# Patient Record
Sex: FEMALE | Race: WHITE | NOT HISPANIC OR LATINO | Employment: PART TIME | URBAN - METROPOLITAN AREA
[De-identification: names, ages, dates, MRNs, and addresses within clinical notes are randomized per-mention and may not be internally consistent; named-entity substitution may affect disease eponyms.]

---

## 2024-06-19 ENCOUNTER — OFFICE VISIT (OUTPATIENT)
Dept: URGENT CARE | Facility: CLINIC | Age: 19
End: 2024-06-19
Payer: COMMERCIAL

## 2024-06-19 VITALS
DIASTOLIC BLOOD PRESSURE: 86 MMHG | OXYGEN SATURATION: 98 % | SYSTOLIC BLOOD PRESSURE: 126 MMHG | WEIGHT: 175 LBS | HEIGHT: 66 IN | BODY MASS INDEX: 28.12 KG/M2 | RESPIRATION RATE: 18 BRPM | TEMPERATURE: 99 F | HEART RATE: 100 BPM

## 2024-06-19 DIAGNOSIS — J02.9 SORE THROAT: Primary | ICD-10-CM

## 2024-06-19 LAB
CTP QC/QA: YES
CTP QC/QA: YES
MOLECULAR STREP A: NEGATIVE
SARS-COV-2 RDRP RESP QL NAA+PROBE: NEGATIVE

## 2024-06-19 RX ORDER — LISDEXAMFETAMINE DIMESYLATE 50 MG/1
50 CAPSULE ORAL
COMMUNITY
Start: 2024-03-22

## 2024-06-19 RX ORDER — DEXAMETHASONE SODIUM PHOSPHATE 100 MG/10ML
10 INJECTION INTRAMUSCULAR; INTRAVENOUS
Status: COMPLETED | OUTPATIENT
Start: 2024-06-19 | End: 2024-06-19

## 2024-06-19 RX ORDER — DEXTROAMPHETAMINE SACCHARATE, AMPHETAMINE ASPARTATE MONOHYDRATE, DEXTROAMPHETAMINE SULFATE AND AMPHETAMINE SULFATE 5; 5; 5; 5 MG/1; MG/1; MG/1; MG/1
CAPSULE, EXTENDED RELEASE ORAL EVERY MORNING
COMMUNITY
Start: 2024-06-19

## 2024-06-19 RX ORDER — AZITHROMYCIN 500 MG/1
500 TABLET, FILM COATED ORAL DAILY
Qty: 5 TABLET | Refills: 0 | Status: SHIPPED | OUTPATIENT
Start: 2024-06-19 | End: 2024-06-24

## 2024-06-19 RX ORDER — PREDNISONE 10 MG/1
30 TABLET ORAL DAILY
Qty: 15 TABLET | Refills: 0 | Status: SHIPPED | OUTPATIENT
Start: 2024-06-19 | End: 2024-06-24

## 2024-06-19 RX ADMIN — DEXAMETHASONE SODIUM PHOSPHATE 10 MG: 100 INJECTION INTRAMUSCULAR; INTRAVENOUS at 06:06

## 2024-06-19 NOTE — PATIENT INSTRUCTIONS
Strep and covid test negative   If no improvement in 2-3 days begin antibiotic. Start taking an allergy pill daily such as claritin, zyrtec, allegrea or xyzal. Also start using a nasal steroid spray such as flonase or nasacort daily. If you are not being treated for high blood pressure, you can also take decongestant such as sudafed as needed. They can be purchased over the counter. If oral steroids were prescribed, start them tomorrow morning. Monitor for fever. Take tylenol/acetaminophen or ibuprofen as needed. Rest and hydrate. If symptoms persist or worsen, return to clinic or seek medical attention immediately.

## 2024-06-19 NOTE — PROGRESS NOTES
"Subjective:      Patient ID: Karen Carrillo is a 18 y.o. female.    Vitals:  height is 5' 6" (1.676 m) and weight is 79.4 kg (175 lb). Her temperature is 99 °F (37.2 °C). Her blood pressure is 126/86 and her pulse is 100. Her respiration is 18 and oxygen saturation is 98%.     Chief Complaint: Sore Throat    Patient is a 18 y.o. female who presents to urgent care with complaints of sore throat, body aches, headache x this morning, mild throat irritation last night. Alleviating factors include Advil, cough drops with mild amount of relief. Patient denies fever, cough, SOB, wheeze, hemoptysis, malaise, or CP or any other symptoms.     Sore Throat   Pertinent negatives include no congestion, coughing, ear discharge, ear pain, shortness of breath, stridor or trouble swallowing.     Constitution: Negative for chills, sweating, fatigue and fever.   HENT:  Positive for postnasal drip and sore throat. Negative for ear pain, ear discharge, congestion, sinus pain, sinus pressure, trouble swallowing and voice change.    Neck: neck negative.   Eyes: Negative.    Respiratory:  Negative for cough, sputum production, shortness of breath, stridor, wheezing and asthma.    Gastrointestinal: Negative.    Musculoskeletal: Negative.    Skin:  Negative for pale and rash.   Allergic/Immunologic: Negative for asthma.   Neurological: Negative.    Psychiatric/Behavioral: Negative.        Objective:     Physical Exam   Constitutional: She is oriented to person, place, and time. She appears well-developed. She is cooperative.  Non-toxic appearance. She does not appear ill. No distress.   HENT:   Head: Normocephalic and atraumatic.   Ears:   Right Ear: Hearing, tympanic membrane, external ear and ear canal normal.   Left Ear: Hearing, tympanic membrane, external ear and ear canal normal.   Nose: Nose normal. No mucosal edema, rhinorrhea or nasal deformity. No epistaxis. Right sinus exhibits no maxillary sinus tenderness and no frontal sinus " tenderness. Left sinus exhibits no maxillary sinus tenderness and no frontal sinus tenderness.   Mouth/Throat: Uvula is midline and mucous membranes are normal. Mucous membranes are moist. No trismus in the jaw. Normal dentition. No uvula swelling. Posterior oropharyngeal erythema present. No oropharyngeal exudate or posterior oropharyngeal edema.      Comments: Petechiae to soft palate.  Tonsils surgically absent  Eyes: Conjunctivae and lids are normal. No scleral icterus.   Neck: Trachea normal and phonation normal. Neck supple. No edema present. No erythema present. No neck rigidity present.   Cardiovascular: Normal rate, regular rhythm, normal heart sounds and normal pulses.   Pulmonary/Chest: Effort normal and breath sounds normal. No respiratory distress. She has no decreased breath sounds. She has no rhonchi.   Abdominal: Normal appearance.   Musculoskeletal: Normal range of motion.         General: No deformity. Normal range of motion.   Neurological: She is alert and oriented to person, place, and time. She exhibits normal muscle tone. Coordination normal.   Skin: Skin is warm, dry, intact, not diaphoretic and not pale.   Psychiatric: Her speech is normal and behavior is normal. Judgment and thought content normal.   Nursing note and vitals reviewed.         Previous History      Review of patient's allergies indicates:   Allergen Reactions    Amoxicillin        Past Medical History:   Diagnosis Date    Known health problems: none      Current Outpatient Medications   Medication Instructions    azithromycin (ZITHROMAX) 500 mg, Oral, Daily    dextroamphetamine-amphetamine (ADDERALL XR) 20 MG 24 hr capsule Every morning    lisdexamfetamine (VYVANSE) 50 mg    predniSONE (DELTASONE) 30 mg, Oral, Daily     Past Surgical History:   Procedure Laterality Date    NASAL SINUS SURGERY       Family History   Problem Relation Name Age of Onset    No Known Problems Mother      No Known Problems Father         Social  "History     Tobacco Use    Smoking status: Never    Smokeless tobacco: Never   Substance Use Topics    Alcohol use: Not Currently    Drug use: Never        Physical Exam      Vital Signs Reviewed   /86   Pulse 100   Temp 99 °F (37.2 °C)   Resp 18   Ht 5' 6" (1.676 m)   Wt 79.4 kg (175 lb)   LMP 06/19/2024 (Exact Date)   SpO2 98%   BMI 28.25 kg/m²        Procedures    Procedures     Labs     Results for orders placed or performed in visit on 06/19/24   POCT Strep A, Molecular   Result Value Ref Range    Molecular Strep A, POC Negative Negative     Acceptable Yes    POCT COVID-19 Rapid Screening   Result Value Ref Range    POC Rapid COVID Negative Negative     Acceptable Yes         Assessment:     1. Sore throat        Plan:   Strep and covid test negative   If no improvement in 2-3 days begin antibiotic. Start taking an allergy pill daily such as claritin, zyrtec, allegrea or xyzal. Also start using a nasal steroid spray such as flonase or nasacort daily. If you are not being treated for high blood pressure, you can also take decongestant such as sudafed as needed. They can be purchased over the counter. If oral steroids were prescribed, start them tomorrow morning. Monitor for fever. Take tylenol/acetaminophen or ibuprofen as needed. Rest and hydrate. If symptoms persist or worsen, return to clinic or seek medical attention immediately.      Sore throat  -     POCT Strep A, Molecular  -     POCT COVID-19 Rapid Screening    Other orders  -     dexAMETHasone injection 10 mg  -     azithromycin (ZITHROMAX) 500 MG tablet; Take 1 tablet (500 mg total) by mouth once daily. for 5 days  Dispense: 5 tablet; Refill: 0  -     predniSONE (DELTASONE) 10 MG tablet; Take 3 tablets (30 mg total) by mouth once daily. for 5 days  Dispense: 15 tablet; Refill: 0                    "